# Patient Record
Sex: FEMALE | Race: WHITE | NOT HISPANIC OR LATINO | Employment: OTHER | URBAN - METROPOLITAN AREA
[De-identification: names, ages, dates, MRNs, and addresses within clinical notes are randomized per-mention and may not be internally consistent; named-entity substitution may affect disease eponyms.]

---

## 2021-02-14 ENCOUNTER — APPOINTMENT (OUTPATIENT)
Dept: GENERAL RADIOLOGY | Facility: HOSPITAL | Age: 86
End: 2021-02-14

## 2021-02-14 ENCOUNTER — HOSPITAL ENCOUNTER (EMERGENCY)
Facility: HOSPITAL | Age: 86
Discharge: HOME | End: 2021-02-14
Attending: EMERGENCY MEDICINE | Admitting: EMERGENCY MEDICINE

## 2021-02-14 VITALS
BODY MASS INDEX: 24.66 KG/M2 | RESPIRATION RATE: 16 BRPM | SYSTOLIC BLOOD PRESSURE: 125 MMHG | DIASTOLIC BLOOD PRESSURE: 78 MMHG | HEART RATE: 88 BPM | WEIGHT: 148 LBS | TEMPERATURE: 98.1 F | OXYGEN SATURATION: 97 % | HEIGHT: 65 IN

## 2021-02-14 DIAGNOSIS — S73.035A ANTERIOR DISLOCATION OF LEFT HIP, INITIAL ENCOUNTER (HCC): Primary | ICD-10-CM

## 2021-02-14 DIAGNOSIS — I48.20 CHRONIC ATRIAL FIBRILLATION (HCC): ICD-10-CM

## 2021-02-14 PROCEDURE — 25010000002 PROPOFOL 10 MG/ML EMULSION: Performed by: EMERGENCY MEDICINE

## 2021-02-14 PROCEDURE — 99284 EMERGENCY DEPT VISIT MOD MDM: CPT

## 2021-02-14 PROCEDURE — 73501 X-RAY EXAM HIP UNI 1 VIEW: CPT

## 2021-02-14 PROCEDURE — 93010 ELECTROCARDIOGRAM REPORT: CPT | Performed by: INTERNAL MEDICINE

## 2021-02-14 PROCEDURE — 73502 X-RAY EXAM HIP UNI 2-3 VIEWS: CPT

## 2021-02-14 PROCEDURE — 93005 ELECTROCARDIOGRAM TRACING: CPT | Performed by: EMERGENCY MEDICINE

## 2021-02-14 PROCEDURE — 99152 MOD SED SAME PHYS/QHP 5/>YRS: CPT

## 2021-02-14 RX ORDER — KETAMINE HYDROCHLORIDE 100 MG/ML
INJECTION INTRAMUSCULAR; INTRAVENOUS
Status: COMPLETED | OUTPATIENT
Start: 2021-02-14 | End: 2021-02-14

## 2021-02-14 RX ORDER — PROPOFOL 10 MG/ML
VIAL (ML) INTRAVENOUS
Status: COMPLETED | OUTPATIENT
Start: 2021-02-14 | End: 2021-02-14

## 2021-02-14 RX ORDER — PROPOFOL 10 MG/ML
50 VIAL (ML) INTRAVENOUS ONCE
Status: DISCONTINUED | OUTPATIENT
Start: 2021-02-14 | End: 2021-02-14

## 2021-02-14 RX ORDER — KETAMINE HCL IN NACL, ISO-OSM 100MG/10ML
25 SYRINGE (ML) INJECTION ONCE
Status: DISCONTINUED | OUTPATIENT
Start: 2021-02-14 | End: 2021-02-14

## 2021-02-14 RX ADMIN — KETAMINE HYDROCHLORIDE 25 MG: 100 INJECTION INTRAMUSCULAR; INTRAVENOUS at 15:18

## 2021-02-14 RX ADMIN — SODIUM CHLORIDE 1000 ML: 9 INJECTION, SOLUTION INTRAVENOUS at 15:28

## 2021-02-14 RX ADMIN — PROPOFOL 30 MG: 10 INJECTION, EMULSION INTRAVENOUS at 15:20

## 2021-02-14 RX ADMIN — PROPOFOL 30 MG: 10 INJECTION, EMULSION INTRAVENOUS at 15:31

## 2021-02-14 NOTE — ED NOTES
Attempted to reach pt's daughter listed in demographics, left message in voicemail     Kari Connolly, RN  02/14/21 [id number]

## 2021-02-14 NOTE — ED NOTES
Pt's daughter to come  pt, coming from Jeevan Connolly, Kari Morrissey, KATHARINA  02/14/21 3736

## 2021-02-14 NOTE — ED NOTES
Spoke to Marilyn GONZALEZ at [facility]. Advised Md requests pt to have elevated toilet seat and that she will be returning via private vehicle with an abductor pillow     Mohsen, Kari Morrissey RN  02/14/21 1476

## 2021-02-14 NOTE — ED TRIAGE NOTES
Pt arrives via EMS from an Assisted living facility. Was bent over when she heard a pop. Pt has shortening and rotation to her left hip with obvious deformity. Pt received ketamine prior to arrival. Pt states that she does not process pain medications normally. Patient masked at arrival and triage staff wore all appropriate PPE during entire encounter with patient.

## 2021-02-14 NOTE — ED NOTES
Spoke to pt's son Mark, asked if he could try to contact his sisters to get pt a ride back to facility     Mohsen, Kari [place], RN  02/14/21 3136

## 2021-02-14 NOTE — ED PROVIDER NOTES
EMERGENCY DEPARTMENT ENCOUNTER    Room Number:  34/34  Date of encounter:  2/14/2021  PCP: Pao Zamora MD  Historian: Pt    Patient was placed in face mask during triage process. Patient was wearing facemask when I entered the room and throughout our encounter. I wore full protective equipment throughout this patient encounter including a face mask, eye protection, and gloves. Hand hygiene was performed before donning protective equipment and again following doffing of PPE after leaving the room.    HPI:  Chief Complaint: left hip pain  A complete HPI/ROS/PMH/PSH/SH/FH are unobtainable due to: N/A   Context: Sarahi Bentley is a 85 y.o. female who presents to the ED c/o left hip pain consistent with history of prior dislocation.  Patient notes that she was bending over cleaning up an area in her kitchen when she felt a pop and had sudden pain in her left hip and had to sit down.  She has been unable to bear weight on it since this event.  Patient has a history of bilateral hip prostheses as well as bilateral knee prostheses.  Patient had similar event last June and was seen at another facility.  Patient denies chest pain, shortness of breath and cough, and fever.  Moderately severe discomfort at this time.  Worse with any attempted range of motion of left hip.      MEDICAL HISTORY REVIEW  Atrial fibrillation, HTN, Greyson hip and knee replacements    PAST MEDICAL HISTORY  Active Ambulatory Problems     Diagnosis Date Noted   • No Active Ambulatory Problems     Resolved Ambulatory Problems     Diagnosis Date Noted   • No Resolved Ambulatory Problems     No Additional Past Medical History         PAST SURGICAL HISTORY  No past surgical history on file.      FAMILY HISTORY  No family history on file.      SOCIAL HISTORY  Social History     Socioeconomic History   • Marital status:      Spouse name: Not on file   • Number of children: Not on file   • Years of education: Not on file   • Highest  education level: Not on file         ALLERGIES  Patient has no known allergies.        REVIEW OF SYSTEMS  Review of Systems     All systems reviewed and negative except for those discussed in HPI.       PHYSICAL EXAM    I have reviewed the triage vital signs and nursing notes.    ED Triage Vitals   Temp Heart Rate Resp BP SpO2   02/14/21 1405 02/14/21 1305 02/14/21 1305 02/14/21 1305 02/14/21 1305   98.1 °F (36.7 °C) 74 16 140/69 96 %      Temp src Heart Rate Source Patient Position BP Location FiO2 (%)   02/14/21 1405 02/14/21 1305 02/14/21 1305 02/14/21 1517 --   Oral Monitor Lying Right arm          Physical Exam    Physical Exam   Constitutional: No distress but uncomfortable appearing.  HENT:  Head: Normocephalic and atraumatic.   Oropharynx: Mucous membranes are moist.   Eyes: No scleral icterus. No conjunctival pallor.  Neck: Painless range of motion noted. Neck supple.   Cardiovascular: Pink warm and well-perfused with strong radial pulse noted on the left.  Pulmonary/Chest: No respiratory distress.  No tachypnea or increased work of breathing noted.    Abdominal: Soft. There is no tenderness. There is no rebound and no guarding.   Musculoskeletal: Atraumatic upper extremity exam.  Moves right lower extremity without difficulty.  Left lower extremity is internally rotated and shortened consistent with patient's concern about left hip dislocation.  Cap refill left lower extremity is normal with sensation to light touch intact.    Neurological: Alert.  Baseline strength and sensation noted.   Skin: Skin is pink, warm, and dry. No pallor.   Psychiatric: Mood and affect normal.   Nursing note and vitals reviewed.    LAB RESULTS  Recent Results (from the past 24 hour(s))   ECG 12 Lead    Collection Time: 02/14/21  3:05 PM   Result Value Ref Range    QT Interval 370 ms       Ordered the above labs and independently reviewed the results.        RADIOLOGY  Xr Hip With Or Without Pelvis 2 - 3 View Left    Result  Date: 2/14/2021  Pelvis and bilateral hip radiograph  HISTORY:Deformity  TECHNIQUE: AP pelvis and single AP radiographs of the bilateral hips  COMPARISON:Hip radiographs 11/20/2006      FINDINGS AND IMPRESSION: Moderate to severe multilevel degenerative changes are present within the visualized lower lumbar spine and incompletely evaluated. Findings can be further evaluated with MRI if clinically indicated. 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 The left hip arthroplasty is dislocated posteriorly. While no periprosthetic fracture is visualized, evaluation is limited due to only a single AP radiograph provided.  This report was finalized on 2/14/2021 2:30 PM by Dr. Andrzej Greenberg M.D.      RADIOLOGY        Study: Single view left hip-post reduction    Findings: Significant improved positioning consistent with reduced joint    Interpreted contemporaneously with treatment by me, independently viewed by me    I ordered the above noted radiological studies. Reviewed by me and discussed with radiologist.  See dictation for official radiology interpretation.      PROCEDURES    Procedural Sedation    Date/Time: 2/14/2021 3:47 PM  Performed by: Irwin Crain MD  Authorized by: Irwin Crain MD     Consent:     Consent obtained:  Verbal    Consent given by:  Patient    Risks discussed:  Allergic reaction, dysrhythmia, inadequate sedation, nausea, vomiting, respiratory compromise necessitating ventilatory assistance and intubation, prolonged sedation necessitating reversal and prolonged hypoxia resulting in organ damage    Alternatives discussed: Surgical intervention.  Indications:     Procedure performed:  Dislocation reduction    Procedure necessitating sedation performed by:  Different physician    Intended level of sedation:  Moderate (conscious sedation)  Pre-sedation assessment:     Time since last food or drink:  7    ASA classification: class 2 - patient with mild systemic disease      Neck mobility: normal       Mouth opening:  3 or more finger widths    Thyromental distance:  4 finger widths    Mallampati score:  I - soft palate, uvula, fauces, pillars visible    Pre-sedation assessments completed and reviewed: airway patency, anesthesia/sedation history, cardiovascular function, hydration status, mental status, nausea/vomiting, pain level and respiratory function    Immediate pre-procedure details:     Reviewed: vital signs and relevant labs/tests      Verified: bag valve mask available, emergency equipment available, intubation equipment available, IV patency confirmed and oxygen available    Procedure details (see MAR for exact dosages):     Preoxygenation:  Nasal cannula    Sedation: Propofol.    Analgesia: Ketamine.    Intra-procedure monitoring:  Blood pressure monitoring, cardiac monitor, continuous capnometry, continuous pulse oximetry, frequent LOC assessments and frequent vital sign checks    Intra-procedure events: hypoxia      Intra-procedure management:  Airway repositioning and BVM ventilation    Total sedation time (minutes):  10  Post-procedure details:     Attendance: Constant attendance by certified staff until patient recovered      Recovery: Patient returned to pre-procedure baseline      Post-sedation assessments completed and reviewed: airway patency, cardiovascular function, hydration status, mental status, nausea/vomiting, pain level and respiratory function      Patient is stable for discharge or admission: yes      Patient tolerance:  Tolerated well, no immediate complications            MEDICATIONS GIVEN IN ER    Medications   sodium chloride 0.9 % bolus 1,000 mL (0 mL Intravenous Stopped 2/14/21 1544)   ketamine (KETALAR) injection (25 mg Intravenous Given 2/14/21 1518)   Propofol (DIPRIVAN) injection (30 mg Intravenous Given 2/14/21 1520)   Propofol (DIPRIVAN) injection (30 mg Intravenous Given 2/14/21 1531)         PROGRESS, DATA ANALYSIS, CONSULTS, AND MEDICAL DECISION MAKING    My  diagnosis for lower extremity pain and injury includes but is not limited to hip fracture, femur fracture, hip dislocation, hip contusion, hip sprain, hip strain, pelvic fracture, knee sprain, patella dislocation, knee dislocation, internal derangement of knee, fractures of the femur, tibia, fibula, ankle, foot and digits, ankle sprain, ankle dislocation, Lisfranc fracture, fracture dislocations of the digits, pulmonary embolism, claudication, peripheral vascular disease, gout, osteoarthritis, rheumatoid arthritis, bursitis, septic joint, poly-rheumatica, polyarthralgia and other inflammatory or infectious disease processes.      All labs have been independently reviewed by me.  All radiology studies have been reviewed by me and discussed with radiologist dictating the report.   EKG's independently viewed and interpreted by me.  Discussion below represents my analysis of pertinent findings related to patient's condition, differential diagnosis, treatment plan and final disposition.      ED Course as of Feb 14 1554   Sun Feb 14, 2021   1416 CONSULT        Provider: Dr. Libby Ram    Discussion: Reviewed pt history, ED presentation and images together. Agreeable c attempted ED reduction. Will see pt in office in follow-up.    Agreeable c treatment and planned disposition.            [RS]   1512 EKG           EKG time: 1505  Rhythm/Rate: a.fib; 85  P waves and NM: na  QRS, axis: narrow   ST and T waves: no STEMI; QTc wnls    Interpreted Contemporaneously by me, independently viewed        [RS]      ED Course User Index  [RS] Irwin Crain MD       AS OF 15:54 EST VITALS:    BP - 120/49  HR - 81  TEMP - 98.1 °F (36.7 °C) (Oral)  O2 SATS - 97%        DIAGNOSIS  Final diagnoses:   Anterior dislocation of left hip, initial encounter (CMS/[facility])   Chronic atrial fibrillation (CMS/[facility])         DISPOSITION  DISCHARGE    Patient discharged in stable condition.    Reviewed implications of results, diagnosis, meds,  responsibility to follow up, warning signs and symptoms of possible worsening, potential complications and reasons to return to ER.    Patient/Family voiced understanding of above instructions.    Discussed plan for discharge, as there is no emergent indication for admission. Patient referred to primary care provider for regular health maintenance. Pt/family is agreeable and understands need for follow up and possible repeat testing.  Pt is aware that discharge does not mean that nothing is wrong but it indicates no emergency is present that requires admission and they must continue care with follow-up as given below or physician of their choice.     FOLLOW-UP  Pao Zamora MD  [address]  [facility] [id number] [phone]    Schedule an appointment as soon as possible for a visit   As needed    Josh Souza MD  [address]  [facility] [id number] [phone]    Schedule an appointment as soon as possible for a visit in 1 week           Medication List      No changes were made to your prescriptions during this visit.            Irwin Crain MD  02/14/21 5081

## 2021-02-14 NOTE — ED PROVIDER NOTES
"Lower Extremity Dislocation    Date/Time: 2/14/2021 3:37 PM  Performed by: Edmundo Wilson PA  Authorized by: Irwin Crain MD   Consent: Verbal consent obtained.  Risks and benefits: risks, benefits and alternatives were discussed  Consent given by: patient  Patient understanding: patient states understanding of the procedure being performed  Patient consent: the patient's understanding of the procedure matches consent given  Procedure consent: procedure consent matches procedure scheduled  Relevant documents: relevant documents present and verified  Test results: test results available and properly labeled  Imaging studies: imaging studies available  Required items: required blood products, implants, devices, and special equipment available  Patient identity confirmed: verbally with patient, arm band and hospital-assigned identification number  Time out: Immediately prior to procedure a \"time out\" was called to verify the correct patient, procedure, equipment, support staff and site/side marked as required.  Injury location: hip  Location details: left hip  Injury type: dislocation  Dislocation type: posterior  Spontaneous dislocation: yes  Prosthesis: yes  Pre-procedure neurovascular assessment: neurovascularly intact  Pre-procedure distal perfusion: normal  Pre-procedure neurological function: normal  Pre-procedure range of motion: reduced    Sedation:  Patient sedated: See Dr. Crain's note for sedation.    Manipulation performed: yes  Reduction method: traction and counter traction  Reduction successful: yes  X-ray confirmed reduction: yes  Immobilization: splint  Post-procedure neurovascular assessment: post-procedure neurovascularly intact  Post-procedure distal perfusion: normal  Post-procedure neurological function: normal  Post-procedure range of motion: normal  Patient tolerance: patient tolerated the procedure well with no immediate complications             Edmundo Wilson PA  02/14/21 1940    "

## 2021-02-15 LAB — QT INTERVAL: 370 MS

## 2022-06-01 ENCOUNTER — ANESTHESIA (OUTPATIENT)
Dept: PERIOP | Facility: HOSPITAL | Age: 87
End: 2022-06-01

## 2022-06-01 ENCOUNTER — ANESTHESIA EVENT (OUTPATIENT)
Dept: PERIOP | Facility: HOSPITAL | Age: 87
End: 2022-06-01

## 2022-06-01 ENCOUNTER — APPOINTMENT (OUTPATIENT)
Dept: GENERAL RADIOLOGY | Facility: HOSPITAL | Age: 87
End: 2022-06-01

## 2022-06-01 ENCOUNTER — HOSPITAL ENCOUNTER (OUTPATIENT)
Facility: HOSPITAL | Age: 87
LOS: 1 days | Discharge: HOME | End: 2022-06-02
Attending: EMERGENCY MEDICINE | Admitting: ORTHOPAEDIC SURGERY

## 2022-06-01 DIAGNOSIS — R53.1 GENERALIZED WEAKNESS: ICD-10-CM

## 2022-06-01 DIAGNOSIS — S73.005A CLOSED DISLOCATION OF LEFT HIP, INITIAL ENCOUNTER: Primary | ICD-10-CM

## 2022-06-01 LAB — SARS-COV-2 RNA PNL SPEC NAA+PROBE: NOT DETECTED

## 2022-06-01 PROCEDURE — 25010000002 FENTANYL CITRATE (PF) 50 MCG/ML SOLUTION: Performed by: EMERGENCY MEDICINE

## 2022-06-01 PROCEDURE — 25010000002 PROPOFOL 10 MG/ML EMULSION: Performed by: ANESTHESIOLOGY

## 2022-06-01 PROCEDURE — 0SWBXJZ REVISION OF SYNTHETIC SUBSTITUTE IN LEFT HIP JOINT, EXTERNAL APPROACH: ICD-10-PCS | Performed by: ORTHOPAEDIC SURGERY

## 2022-06-01 PROCEDURE — C9803 HOPD COVID-19 SPEC COLLECT: HCPCS

## 2022-06-01 PROCEDURE — 25010000002 FENTANYL CITRATE (PF) 50 MCG/ML SOLUTION: Performed by: ANESTHESIOLOGY

## 2022-06-01 PROCEDURE — 73501 X-RAY EXAM HIP UNI 1 VIEW: CPT

## 2022-06-01 PROCEDURE — 25010000002 HYDROMORPHONE PER 4 MG: Performed by: EMERGENCY MEDICINE

## 2022-06-01 PROCEDURE — 25010000002 HYDROMORPHONE 1 MG/ML SOLUTION: Performed by: EMERGENCY MEDICINE

## 2022-06-01 PROCEDURE — 25010000002 ONDANSETRON PER 1 MG: Performed by: EMERGENCY MEDICINE

## 2022-06-01 PROCEDURE — 73502 X-RAY EXAM HIP UNI 2-3 VIEWS: CPT

## 2022-06-01 PROCEDURE — 87635 SARS-COV-2 COVID-19 AMP PRB: CPT | Performed by: ORTHOPAEDIC SURGERY

## 2022-06-01 PROCEDURE — 99284 EMERGENCY DEPT VISIT MOD MDM: CPT

## 2022-06-01 PROCEDURE — 25010000002 PHENYLEPHRINE 10 MG/ML SOLUTION: Performed by: ANESTHESIOLOGY

## 2022-06-01 RX ORDER — FENTANYL CITRATE 50 UG/ML
50 INJECTION, SOLUTION INTRAMUSCULAR; INTRAVENOUS
Status: DISCONTINUED | OUTPATIENT
Start: 2022-06-01 | End: 2022-06-01 | Stop reason: HOSPADM

## 2022-06-01 RX ORDER — FAMOTIDINE 10 MG/ML
20 INJECTION, SOLUTION INTRAVENOUS ONCE
Status: COMPLETED | OUTPATIENT
Start: 2022-06-01 | End: 2022-06-01

## 2022-06-01 RX ORDER — ONDANSETRON 2 MG/ML
4 INJECTION INTRAMUSCULAR; INTRAVENOUS ONCE
Status: COMPLETED | OUTPATIENT
Start: 2022-06-01 | End: 2022-06-01

## 2022-06-01 RX ORDER — NALOXONE HCL 0.4 MG/ML
0.2 VIAL (ML) INJECTION AS NEEDED
Status: DISCONTINUED | OUTPATIENT
Start: 2022-06-01 | End: 2022-06-01 | Stop reason: HOSPADM

## 2022-06-01 RX ORDER — TRAZODONE HYDROCHLORIDE 150 MG/1
150 TABLET ORAL NIGHTLY
Status: ON HOLD | COMMUNITY
End: 2022-06-02

## 2022-06-01 RX ORDER — PROPOFOL 10 MG/ML
VIAL (ML) INTRAVENOUS AS NEEDED
Status: DISCONTINUED | OUTPATIENT
Start: 2022-06-01 | End: 2022-06-01 | Stop reason: SURG

## 2022-06-01 RX ORDER — TRAZODONE HYDROCHLORIDE 50 MG/1
50 TABLET ORAL NIGHTLY
COMMUNITY

## 2022-06-01 RX ORDER — CLONAZEPAM 1 MG/1
1 TABLET ORAL 2 TIMES DAILY
COMMUNITY

## 2022-06-01 RX ORDER — LIDOCAINE HYDROCHLORIDE 20 MG/ML
INJECTION, SOLUTION INFILTRATION; PERINEURAL AS NEEDED
Status: DISCONTINUED | OUTPATIENT
Start: 2022-06-01 | End: 2022-06-01 | Stop reason: SURG

## 2022-06-01 RX ORDER — SODIUM CHLORIDE 0.9 % (FLUSH) 0.9 %
3-10 SYRINGE (ML) INJECTION AS NEEDED
Status: DISCONTINUED | OUTPATIENT
Start: 2022-06-01 | End: 2022-06-01 | Stop reason: HOSPADM

## 2022-06-01 RX ORDER — DIPHENHYDRAMINE HYDROCHLORIDE 50 MG/ML
12.5 INJECTION INTRAMUSCULAR; INTRAVENOUS
Status: DISCONTINUED | OUTPATIENT
Start: 2022-06-01 | End: 2022-06-01 | Stop reason: HOSPADM

## 2022-06-01 RX ORDER — FLUMAZENIL 0.1 MG/ML
0.2 INJECTION INTRAVENOUS AS NEEDED
Status: DISCONTINUED | OUTPATIENT
Start: 2022-06-01 | End: 2022-06-01 | Stop reason: HOSPADM

## 2022-06-01 RX ORDER — LABETALOL HYDROCHLORIDE 5 MG/ML
5 INJECTION, SOLUTION INTRAVENOUS
Status: DISCONTINUED | OUTPATIENT
Start: 2022-06-01 | End: 2022-06-01 | Stop reason: HOSPADM

## 2022-06-01 RX ORDER — LIDOCAINE HYDROCHLORIDE 10 MG/ML
0.5 INJECTION, SOLUTION EPIDURAL; INFILTRATION; INTRACAUDAL; PERINEURAL ONCE AS NEEDED
Status: DISCONTINUED | OUTPATIENT
Start: 2022-06-01 | End: 2022-06-01 | Stop reason: HOSPADM

## 2022-06-01 RX ORDER — DOCUSATE SODIUM 100 MG/1
100 CAPSULE, LIQUID FILLED ORAL DAILY
COMMUNITY

## 2022-06-01 RX ORDER — MIDAZOLAM HYDROCHLORIDE 1 MG/ML
0.5 INJECTION INTRAMUSCULAR; INTRAVENOUS
Status: DISCONTINUED | OUTPATIENT
Start: 2022-06-01 | End: 2022-06-01 | Stop reason: HOSPADM

## 2022-06-01 RX ORDER — ONDANSETRON 4 MG/1
4 TABLET, FILM COATED ORAL EVERY 8 HOURS PRN
COMMUNITY

## 2022-06-01 RX ORDER — METOPROLOL TARTRATE 100 MG/1
100 TABLET ORAL DAILY
Status: ON HOLD | COMMUNITY
End: 2022-06-02 | Stop reason: ALTCHOICE

## 2022-06-01 RX ORDER — CHOLECALCIFEROL (VITAMIN D3) 1250 MCG
50000 CAPSULE ORAL
COMMUNITY

## 2022-06-01 RX ORDER — SODIUM CHLORIDE, SODIUM LACTATE, POTASSIUM CHLORIDE, CALCIUM CHLORIDE 600; 310; 30; 20 MG/100ML; MG/100ML; MG/100ML; MG/100ML
9 INJECTION, SOLUTION INTRAVENOUS CONTINUOUS
Status: DISCONTINUED | OUTPATIENT
Start: 2022-06-01 | End: 2022-06-02 | Stop reason: HOSPADM

## 2022-06-01 RX ORDER — HYDROMORPHONE HYDROCHLORIDE 1 MG/ML
0.5 INJECTION, SOLUTION INTRAMUSCULAR; INTRAVENOUS; SUBCUTANEOUS ONCE
Status: COMPLETED | OUTPATIENT
Start: 2022-06-01 | End: 2022-06-01

## 2022-06-01 RX ORDER — CLONAZEPAM 1 MG/1
2 TABLET ORAL NIGHTLY
Status: ON HOLD | COMMUNITY
End: 2022-06-02

## 2022-06-01 RX ORDER — CHOLECALCIFEROL (VITAMIN D3) 125 MCG
5 CAPSULE ORAL NIGHTLY
COMMUNITY

## 2022-06-01 RX ORDER — EPHEDRINE SULFATE 50 MG/ML
5 INJECTION, SOLUTION INTRAVENOUS ONCE AS NEEDED
Status: DISCONTINUED | OUTPATIENT
Start: 2022-06-01 | End: 2022-06-01 | Stop reason: HOSPADM

## 2022-06-01 RX ORDER — LOSARTAN POTASSIUM 50 MG/1
50 TABLET ORAL DAILY
COMMUNITY

## 2022-06-01 RX ORDER — LOPERAMIDE HYDROCHLORIDE 2 MG/1
2 CAPSULE ORAL DAILY PRN
COMMUNITY

## 2022-06-01 RX ORDER — SODIUM CHLORIDE 0.9 % (FLUSH) 0.9 %
3 SYRINGE (ML) INJECTION EVERY 12 HOURS SCHEDULED
Status: DISCONTINUED | OUTPATIENT
Start: 2022-06-01 | End: 2022-06-01 | Stop reason: HOSPADM

## 2022-06-01 RX ORDER — CLONAZEPAM 0.5 MG/1
0.5 TABLET ORAL
Status: ON HOLD | COMMUNITY
End: 2022-06-02 | Stop reason: DRUGHIGH

## 2022-06-01 RX ORDER — MIRTAZAPINE 30 MG/1
30 TABLET, FILM COATED ORAL NIGHTLY
Status: ON HOLD | COMMUNITY
End: 2022-06-02

## 2022-06-01 RX ORDER — PHENYLEPHRINE HYDROCHLORIDE 10 MG/ML
INJECTION INTRAVENOUS AS NEEDED
Status: DISCONTINUED | OUTPATIENT
Start: 2022-06-01 | End: 2022-06-01 | Stop reason: SURG

## 2022-06-01 RX ORDER — FENTANYL CITRATE 50 UG/ML
50 INJECTION, SOLUTION INTRAMUSCULAR; INTRAVENOUS ONCE
Status: COMPLETED | OUTPATIENT
Start: 2022-06-01 | End: 2022-06-01

## 2022-06-01 RX ORDER — HYDRALAZINE HYDROCHLORIDE 20 MG/ML
5 INJECTION INTRAMUSCULAR; INTRAVENOUS
Status: DISCONTINUED | OUTPATIENT
Start: 2022-06-01 | End: 2022-06-01 | Stop reason: HOSPADM

## 2022-06-01 RX ORDER — ONDANSETRON 2 MG/ML
4 INJECTION INTRAMUSCULAR; INTRAVENOUS ONCE AS NEEDED
Status: DISCONTINUED | OUTPATIENT
Start: 2022-06-01 | End: 2022-06-01 | Stop reason: HOSPADM

## 2022-06-01 RX ORDER — DIPHENHYDRAMINE HCL 25 MG
25 CAPSULE ORAL
Status: DISCONTINUED | OUTPATIENT
Start: 2022-06-01 | End: 2022-06-01 | Stop reason: HOSPADM

## 2022-06-01 RX ORDER — ESCITALOPRAM OXALATE 10 MG/1
10 TABLET ORAL DAILY
COMMUNITY

## 2022-06-01 RX ORDER — HYDROCODONE BITARTRATE AND ACETAMINOPHEN 5; 325 MG/1; MG/1
1 TABLET ORAL EVERY 6 HOURS PRN
Status: DISCONTINUED | OUTPATIENT
Start: 2022-06-01 | End: 2022-06-01 | Stop reason: HOSPADM

## 2022-06-01 RX ADMIN — LIDOCAINE HYDROCHLORIDE 60 MG: 20 INJECTION, SOLUTION INFILTRATION; PERINEURAL at 19:04

## 2022-06-01 RX ADMIN — FENTANYL CITRATE 50 MCG: 0.05 INJECTION, SOLUTION INTRAMUSCULAR; INTRAVENOUS at 16:36

## 2022-06-01 RX ADMIN — PROPOFOL 50 MG: 10 INJECTION, EMULSION INTRAVENOUS at 19:08

## 2022-06-01 RX ADMIN — PHENYLEPHRINE HYDROCHLORIDE 200 MCG: 10 INJECTION, SOLUTION INTRAVENOUS at 19:15

## 2022-06-01 RX ADMIN — SODIUM CHLORIDE, POTASSIUM CHLORIDE, SODIUM LACTATE AND CALCIUM CHLORIDE 9 ML/HR: 600; 310; 30; 20 INJECTION, SOLUTION INTRAVENOUS at 18:37

## 2022-06-01 RX ADMIN — ONDANSETRON 4 MG: 2 INJECTION INTRAMUSCULAR; INTRAVENOUS at 13:27

## 2022-06-01 RX ADMIN — HYDROMORPHONE HYDROCHLORIDE 0.5 MG: 1 INJECTION, SOLUTION INTRAMUSCULAR; INTRAVENOUS; SUBCUTANEOUS at 13:27

## 2022-06-01 RX ADMIN — PROPOFOL 150 MG: 10 INJECTION, EMULSION INTRAVENOUS at 19:05

## 2022-06-01 RX ADMIN — FENTANYL CITRATE 25 MCG: 0.05 INJECTION, SOLUTION INTRAMUSCULAR; INTRAVENOUS at 18:38

## 2022-06-01 RX ADMIN — FAMOTIDINE 20 MG: 10 INJECTION, SOLUTION INTRAVENOUS at 18:37

## 2022-06-01 RX ADMIN — HYDROMORPHONE HYDROCHLORIDE 1 MG: 1 INJECTION, SOLUTION INTRAMUSCULAR; INTRAVENOUS; SUBCUTANEOUS at 14:15

## 2022-06-01 RX ADMIN — FENTANYL CITRATE 25 MCG: 0.05 INJECTION, SOLUTION INTRAMUSCULAR; INTRAVENOUS at 18:45

## 2022-06-01 NOTE — ED NOTES
PT presents to ED via EMS from facility. Pt fell off her bed and injured her L hip. Upon EMS arrival pt has obvious deformity. PT is A&OX4, did not ambulate, and in a mask at this time.     Patient was placed in face mask during first look triage.  Patient was wearing a face mask throughout encounter.  I wore personal protective equipment throughout the encounter.  Hand hygiene was performed before and after patient encounter.

## 2022-06-01 NOTE — OP NOTE
Procedure Note    Sarahi Bentley  6/1/2022    Pre-op Diagnosis:   1.  Acute left hip prosthetic dislocation       Post-Op Diagnosis Codes:  Same    Procedure:  1.  Closed reduction under anesthesia, left hip prosthetic dislocation    Surgeon(s):  Tristan Buckner Jr., MD    Assistants:  None    Anesthesia: General    Estimated Blood Loss: none    Specimens:                None      Drains: * No LDAs found *    Complications:   None apparent    Disposition:  Stable to PACU for recovery    Indications for procedure:  The patient is an 87-year-old female who has had a history of left periprosthetic hip dislocations who presented today with left hip deformity and pain.  Radiographs demonstrated a left periprosthetic hip dislocation without fracture.  The above listed procedures were recommended. I have discussed the risks and benefits of the procedure including risks of anesthesia, periprosthetic fracture, inability to get the hip reduced and the patient would like to proceed with surgery.    Procedure in detail:  The correct patient was identified in preoperative holding.  All risks and benefits of surgery were again discussed in detail, and the patient agreed to proceed with surgery.  The operative extremity was confirmed and marked by myself.  Operative consent reviewed and confirmed to be signed by the patient and myself.    At this time, the patient was wheeled to the operative theatre and placed supine on the OR table.  ANNA/SCD placed on nonoperative leg. Anesthesia was induced smoothly by our anesthesia colleagues.    Appropriate presurgical timeout was performed, confirming correct patient, correct extremity, correct procedure; no antibiotics were indicated.    Using a combination of longitudinal traction and gentle progressive flexion with internal and external rotation, the left hip dislocation was able to be reduced fairly easily.  A radiograph confirmed concentric reduction of the hip without associated  fracture.  The hip was taken through a range of motion and found to be stable to 90 degrees of flexion and 20 degrees of internal rotation.    Leg lengths were now equal and symmetric.    An abduction pillow was applied.  The patient was awoken from anesthesia without apparent complication and taken to PACU for recovery.            Tristan Buckner Jr, MD     Date: 6/1/2022  Time: 19:16 EDT

## 2022-06-01 NOTE — CONSULTS
[facility]   Consult Note    Patient Name: Sarahi Bentley  : 1935  MRN: [id number]  Primary Care Physician:  Pao Zamora MD  Referring Physician: No ref. provider found  Date of admission: 2022    Ortho (on-call MD unless specified)  Consult performed by: Tristan Buckner Jr., MD  Consult ordered by: Adonis Oliveira MD        Subjective   Subjective     Reason for Consult/ Chief Complaint: Left hip pain    History of Present Illness  Sarahi Bentley is a 87 y.o. female with history of atrial fibrillation and history of prior left hip replacement who presented with left hip pain and deformity.  She reports she leaned over her bed and felt a pop in her left hip.  She was brought to emergency department where radiographs showed a dislocated left periprosthetic hip.  Orthopedics was consulted for closed reduction.    The patient reports her hip was replaced by Dr. Stanford many years ago.  She thinks the hip has dislocated 2 times.  She denies any known complications following closed reduction.  It appears her last reduction was performed on 2021 and it was successfully reduced under sedation in the emergency department.    Review of Systems   Review of Systems:  Constitutional: Negative  HENT: Negative  Eyes: Negative  Respiratory: Negative; no shortness of breath  Cardiovascular: Negative; no current chest pain  Gastrointestinal: Negative  : Negative  Skin: Negative except as listed in HPI  Neurological: Negative, no numbness or deficits  Hematological: Negative  Muscoloskeletal: Per HPI                     Personal History     Past Medical History:   Diagnosis Date   • Atrial fibrillation (HCC)    • H/O degenerative disc disease        Past Surgical History:   Procedure Laterality Date   • BREAST BIOPSY     • HIP SURGERY Bilateral    • KNEE SURGERY Bilateral        Family History: family history is not on file. Otherwise pertinent FHx was reviewed and not pertinent to current  issue.    Social History:  reports that she has never smoked. She has never used smokeless tobacco. She reports that she does not drink alcohol and does not use drugs.    Home Medications:        Allergies:  Allergies   Allergen Reactions   • Sulfa Antibiotics Rash       Objective    Objective     Vitals:  Temp:  [97.5 °F (36.4 °C)-97.8 °F (36.6 °C)] 97.5 °F (36.4 °C)  Heart Rate:  [] 102  Resp:  [16-20] 16  BP: (130-145)/(61-89) 145/61    Physical Exam  Physical Exam:  Vital signs reviewed.  Constitutional:  Appears well-developed, well nourished.  HEENT:  Head: normocephalic, atraumatic  Eyes: EOMI, grossly normal.  No scleral icterus.  Neck: Normal range of motion.  Supple, no tracheal deviation.  Cardiovascular: Regular rate.  Pulmonary: Effort normal, symmetric chest expansion, no labored breathing.  Abdominal: Soft, non distended  Neurological: No gross deficits, oriented to person, place, and time.  Skin: Warm and dry  Psychiatric: Normal mood and affect  Musculoskeletal:  Examination of her left hip shows no wounds.  Her extremity is internally rotated.  She has pain with any motion at the hip.       Active ankle dorsiflexion and plantarflexion.  Sensation is intact to light touch in sural, saphenous, deep and superficial peroneal, tibial nerve distribution.  Toes are warm and well perfused with brisk capillary refill.           Result Review    Result Review:  Radiographs of the pelvis/left hip show a dislocation of the left hip prosthesis.  No obvious fracture identified.    Assessment & Plan   Assessment / Plan     The patient is a pleasant 87-year-old female with multiple medical comorbidities presenting with a recurrent spontaneous left hip dislocation.  She reports this is her third dislocation.  Her last hip was reduced in February 2021.    I have recommended closed reduction under anesthesia.    I have discussed the risks and benefits of the procedure including risks of anesthesia,  periprosthetic fracture, inability to get the hip reduced and the patient would like to proceed with surgery.          Tristan Buckner Jr, MD

## 2022-06-01 NOTE — ED PROVIDER NOTES
" EMERGENCY DEPARTMENT ENCOUNTER    Room Number:  07/07  Date seen:  6/1/2022  Time seen: 14:13 EDT  PCP: Pao Zamora MD  Historian: patient      HPI:  Chief Complaint: \"My hip is dislocated again\"    A complete HPI/ROS/PMH/PSH/SH/FH are unobtainable due to: none    Context: Sarahi Bentley is a 87 y.o. female who presents to the ED for evaluation of left hip pain that began acutely when she was sitting on her bed reaching for something and felt her left hip \"pop out.\"  She had a left hip replacement years ago with Dr. Young who is since retired.  Since that time she believes this is the third time it has dislocated.  She did not fall.  She arrives via EMS for further evaluation.  On their arrival she was in bed.  She denies any other injuries or complaints.  She states the pain is severe constant worse with any attempted range of motion and nothing makes it better.        PAST MEDICAL HISTORY  Active Ambulatory Problems     Diagnosis Date Noted   • No Active Ambulatory Problems     Resolved Ambulatory Problems     Diagnosis Date Noted   • No Resolved Ambulatory Problems     No Additional Past Medical History         PAST SURGICAL HISTORY  No past surgical history on file.      FAMILY HISTORY  No family history on file.      SOCIAL HISTORY  Social History     Socioeconomic History   • Marital status:          ALLERGIES  Patient has no known allergies.        REVIEW OF SYSTEMS  Review of Systems     All systems reviewed and negative except for those discussed in HPI.       PHYSICAL EXAM  ED Triage Vitals [06/01/22 1305]   Temp Heart Rate Resp BP SpO2   97.8 °F (36.6 °C) 86 20 133/74 99 %      Temp src Heart Rate Source Patient Position BP Location FiO2 (%)   Tympanic Monitor Sitting Right arm --         GENERAL: Distressed, appears in pain  HENT: atraumatic  EYES: no scleral icterus  CV: regular rhythm, regular rate  RESPIRATORY: normal effort CTA B  ABDOMEN: soft, nontender  MUSCULOSKELETAL: " Left hip is internally rotated and shortened.  DP and PT pulses 2+, cap refill brisk and sensation and motor function intact distally.  No tenderness to the right hip  NEURO: alert, moves all extremities, follows commands  SKIN: warm, dry    Vital signs and nursing notes reviewed.          RADIOLOGY  XR Hip With or Without Pelvis 2 - 3 View Left    Result Date: 6/1/2022  Narrative: PELVIS AND LEFT HIP  HISTORY: Left hip pain.  FINDINGS: An AP view of the pelvis as well as AP and frog leg lateral views of the left hip demonstrates bilateral hip prostheses. There is dislocation of the left femoral component superiorly. There is no evidence of fracture. Moderate to severe loss of disc height and vacuum disc effect is noted from L3 to L5.  This report was finalized on 6/1/2022 3:46 PM by Dr. Jon Summers M.D.        I ordered the above noted radiological studies. Reviewed by me and discussed with radiologist.  See dictation for official radiology interpretation.    PROCEDURES  Procedures        MEDICATIONS GIVEN IN ER  Medications   HYDROmorphone (DILAUDID) injection 0.5 mg (0.5 mg Intravenous Given 6/1/22 1327)   ondansetron (ZOFRAN) injection 4 mg (4 mg Intravenous Given 6/1/22 1327)   HYDROmorphone (DILAUDID) injection 1 mg (1 mg Intravenous Given 6/1/22 1415)             PROGRESS AND CONSULTS    DDX includes but not limited to fracture, strain, dislocation    ED Course as of 06/01/22 1619   Wed Jun 01, 2022   1418 My interpretation of the left hip x-ray is acute dislocation of the left hip arthroplasty. [KA]   1420 Medical chart reviewed.  Patient evaluated on 2/14/2021 for a left hip dislocation.  It was reduced in the emergency department without complication. [KA]   1618 Orthopedics repaged.  Full course of care assumed by Dr. Oliveira at this time. [KA]      ED Course User Index  [KA] Rivka Rosen PA             Patient was placed in face mask in first look. Patient was wearing facemask each time I entered  the room and throughout our encounter. I wore protective equipment throughout this patient encounter including a face mask, eye shield and gloves. Hand hygiene was performed before donning protective equipment and after removal when leaving the room.        DIAGNOSIS  Final diagnoses:   Closed dislocation of left hip, initial encounter ([facility])         Latest Documented Vital Signs:  As of 16:19 EDT  BP- 133/84 HR- 95 Temp- 97.8 °F (36.6 °C) (Tympanic) O2 sat- 94%       Rivka Rosen PA  06/01/22 4607

## 2022-06-01 NOTE — ED PROVIDER NOTES
I supervised care provided by the midlevel provider.   We have discussed this patient's history, physical exam, and treatment plan.  I have reviewed the note and personally saw and examined the patient and agree with the plan of care.   Patient was on the bed and leaning over to the Rollator and felt a sudden pop in her left hip.  She is had a history of a dislocation in that left hip before.  It is happened from what she remembers 2 times before.  He might of been third.  The last episode was about 1 year ago and Talley's Day.  Denies any other injury.  Denies any other pain.    GENERAL: Elderly female that is frail and appears chronically ill.  Not distressed  HENT: nares patent  Head/neck/ face are symmetric without gross deformity or swelling  EYES: no scleral icterus  CV: regular rhythm, regular rate with intact distal pulses  RESPIRATORY: normal effort and no respiratory distress  ABDOMEN: soft and nontender  MUSCULOSKELETAL: Left leg is internally rotated and shortened.  Patient has pain with any sort of movement or palpation of the left hip.  She has intact distal pulses to the lower and upper extremities bilaterally.  She has no coolness or cyanosis.  Compartments are soft.  She has no motor or sensory changes to her lower extremities bilaterally.  NEURO: alert and appropriate, moves all extremities, follows commands  SKIN: warm, dry    Vital signs and nursing notes reviewed.    Plan I suspect the patient has a dislocated left hip.  I reviewed the x-rays and also reviewed the radiologist report.  I spoke with No harris the midlevel provider.  We have a call out to orthopedic physician.    The nurse caring for the patient spoke with a nurse with the orthopedic surgeon and the plan is to get a COVID swab and take her up to the OR for reduction.    We are currently under a pandemic from the COVID19 infection.  The patient presented to the emergency department by ambulance or personal vehicle. I followed the  current protocols required by Infection Control at [facility] in my evaluation and treatment of the patient. The patient was wearing a face mask during my evaluation and throughout my encounter. During my whole encounter with this patient I used appropriate personal protective equipment.  This equipment consisted of eye protection, facemask, gown, and gloves.  I applied this equipment before entering the room.      ED Course as of 06/01/22 2112 Wed Jun 01, 2022   1418 My interpretation of the left hip x-ray is acute dislocation of the left hip arthroplasty. [KA]   1420 Medical chart reviewed.  Patient evaluated on 2/14/2021 for a left hip dislocation.  It was reduced in the emergency department without complication. [KA]   1618 Orthopedics repaged.  Full course of care assumed by Dr. Oliveira at this time. [KA]      ED Course User Index  [KA] Rivak Rosen, PA     The orthopedic surgeon Dr. Buckner was in surgery.  The nurse communicated with Dr. Buckner's nurse in the operating room.  The plan is to take the patient up to the operating room for relocation as soon as he is done with the procedure.  Patient remained stable down here in the emergency department and then when surgery was ready they called for the patient and the patient went up to surgery.       Adonis Oliveira MD  06/01/22 2112

## 2022-06-01 NOTE — ANESTHESIA PREPROCEDURE EVALUATION
Anesthesia Evaluation     Patient summary reviewed and Nursing notes reviewed   NPO Solid Status: > 6 hours  NPO Liquid Status: > 2 hours           Airway   Mallampati: II  Dental - normal exam     Pulmonary - normal exam   Cardiovascular - normal exam    ECG reviewed  PT is on anticoagulation therapy    (+) dysrhythmias Atrial Fib,     ROS comment: Reviewed echo:    1. Normal biventricular chamber size and systolic function. LVEF 55-60%.    2. Qualitatively, mildly dilated right and left atria.    3. Sclerotic aortic valve with trivial aortic regurgitation. 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    4. Mild to moderate mitral and tricuspid regurgitation.    5. RVSP estimated at 37 millimeters of mercury.    6. Indeterminate diastolic filling pattern due to underlying arrhythmia.    7. Normal right-sided filling pressures.        Neuro/Psych  GI/Hepatic/Renal/Endo    (+) obesity,       Musculoskeletal     Abdominal    Substance History      OB/GYN          Other                        Anesthesia Plan    ASA 3     general       Anesthetic plan, all risks, benefits, and alternatives have been provided, discussed and informed consent has been obtained with: patient.        CODE STATUS:

## 2022-06-02 VITALS
HEART RATE: 94 BPM | RESPIRATION RATE: 16 BRPM | OXYGEN SATURATION: 96 % | DIASTOLIC BLOOD PRESSURE: 61 MMHG | HEIGHT: 66 IN | WEIGHT: 170.3 LBS | BODY MASS INDEX: 27.37 KG/M2 | TEMPERATURE: 98 F | SYSTOLIC BLOOD PRESSURE: 93 MMHG

## 2022-06-02 PROBLEM — I10 HTN (HYPERTENSION): Status: ACTIVE | Noted: 2022-06-02

## 2022-06-02 PROBLEM — I48.91 ATRIAL FIBRILLATION (HCC): Status: ACTIVE | Noted: 2022-06-02

## 2022-06-02 PROBLEM — K58.9 IBS (IRRITABLE BOWEL SYNDROME): Status: ACTIVE | Noted: 2022-06-02

## 2022-06-02 PROCEDURE — 97110 THERAPEUTIC EXERCISES: CPT

## 2022-06-02 PROCEDURE — 97161 PT EVAL LOW COMPLEX 20 MIN: CPT

## 2022-06-02 RX ORDER — METOPROLOL SUCCINATE 100 MG/1
100 TABLET, EXTENDED RELEASE ORAL DAILY
COMMUNITY

## 2022-06-02 RX ORDER — CLONAZEPAM 1 MG/1
2 TABLET ORAL NIGHTLY
Status: DISCONTINUED | OUTPATIENT
Start: 2022-06-02 | End: 2022-06-02

## 2022-06-02 RX ORDER — MULTIPLE VITAMINS W/ MINERALS TAB 9MG-400MCG
1 TAB ORAL DAILY
COMMUNITY

## 2022-06-02 RX ORDER — CHOLECALCIFEROL (VITAMIN D3) 125 MCG
10 CAPSULE ORAL NIGHTLY
Status: DISCONTINUED | OUTPATIENT
Start: 2022-06-02 | End: 2022-06-02 | Stop reason: HOSPADM

## 2022-06-02 RX ORDER — CLONAZEPAM 1 MG/1
1 TABLET ORAL EVERY MORNING
Status: DISCONTINUED | OUTPATIENT
Start: 2022-06-02 | End: 2022-06-02

## 2022-06-02 RX ORDER — ZOLPIDEM TARTRATE 12.5 MG/1
12.5 TABLET, FILM COATED, EXTENDED RELEASE ORAL NIGHTLY
COMMUNITY

## 2022-06-02 RX ORDER — CLONAZEPAM 0.5 MG/1
0.5 TABLET ORAL
Status: DISCONTINUED | OUTPATIENT
Start: 2022-06-02 | End: 2022-06-02

## 2022-06-02 RX ORDER — CLONAZEPAM 1 MG/1
1 TABLET ORAL EVERY 12 HOURS SCHEDULED
Status: DISCONTINUED | OUTPATIENT
Start: 2022-06-02 | End: 2022-06-02 | Stop reason: HOSPADM

## 2022-06-02 RX ORDER — MIRTAZAPINE 30 MG/1
30 TABLET, FILM COATED ORAL NIGHTLY
COMMUNITY

## 2022-06-02 RX ORDER — MIRTAZAPINE 30 MG/1
30 TABLET, FILM COATED ORAL NIGHTLY
Status: DISCONTINUED | OUTPATIENT
Start: 2022-06-02 | End: 2022-06-02 | Stop reason: HOSPADM

## 2022-06-02 RX ORDER — LOPERAMIDE HYDROCHLORIDE 2 MG/1
2 CAPSULE ORAL DAILY PRN
Status: DISCONTINUED | OUTPATIENT
Start: 2022-06-02 | End: 2022-06-02 | Stop reason: HOSPADM

## 2022-06-02 RX ORDER — ONDANSETRON 2 MG/ML
4 INJECTION INTRAMUSCULAR; INTRAVENOUS EVERY 6 HOURS PRN
Status: DISCONTINUED | OUTPATIENT
Start: 2022-06-02 | End: 2022-06-02 | Stop reason: HOSPADM

## 2022-06-02 RX ORDER — ESCITALOPRAM OXALATE 10 MG/1
10 TABLET ORAL DAILY
Status: DISCONTINUED | OUTPATIENT
Start: 2022-06-02 | End: 2022-06-02 | Stop reason: HOSPADM

## 2022-06-02 RX ORDER — METOPROLOL TARTRATE 50 MG/1
100 TABLET, FILM COATED ORAL DAILY
Status: DISCONTINUED | OUTPATIENT
Start: 2022-06-02 | End: 2022-06-02

## 2022-06-02 RX ORDER — METOPROLOL SUCCINATE 100 MG/1
100 TABLET, EXTENDED RELEASE ORAL
Status: DISCONTINUED | OUTPATIENT
Start: 2022-06-03 | End: 2022-06-02 | Stop reason: HOSPADM

## 2022-06-02 RX ORDER — TRAZODONE HYDROCHLORIDE 50 MG/1
50 TABLET ORAL NIGHTLY
Status: DISCONTINUED | OUTPATIENT
Start: 2022-06-02 | End: 2022-06-02 | Stop reason: HOSPADM

## 2022-06-02 RX ORDER — ACETAMINOPHEN 325 MG/1
650 TABLET ORAL EVERY 6 HOURS PRN
Status: DISCONTINUED | OUTPATIENT
Start: 2022-06-02 | End: 2022-06-02 | Stop reason: HOSPADM

## 2022-06-02 RX ORDER — DOCUSATE SODIUM 100 MG/1
100 CAPSULE, LIQUID FILLED ORAL DAILY
Status: DISCONTINUED | OUTPATIENT
Start: 2022-06-02 | End: 2022-06-02 | Stop reason: HOSPADM

## 2022-06-02 RX ADMIN — Medication 10 MG: at 00:31

## 2022-06-02 RX ADMIN — ESCITALOPRAM 10 MG: 10 TABLET, FILM COATED ORAL at 12:42

## 2022-06-02 RX ADMIN — METOPROLOL TARTRATE 100 MG: 50 TABLET, FILM COATED ORAL at 09:08

## 2022-06-02 RX ADMIN — CLONAZEPAM 2 MG: 1 TABLET ORAL at 00:31

## 2022-06-02 RX ADMIN — TRAZODONE HYDROCHLORIDE 150 MG: 100 TABLET ORAL at 00:31

## 2022-06-02 RX ADMIN — MIRTAZAPINE 30 MG: 30 TABLET, ORALLY DISINTEGRATING ORAL at 00:30

## 2022-06-02 RX ADMIN — CLONAZEPAM 1 MG: 1 TABLET ORAL at 09:07

## 2022-06-02 NOTE — CASE MANAGEMENT/SOCIAL WORK
Continued Stay Note  [facility]     Patient Name: Sarahi Bentley  MRN: [id number]  Today's Date: 6/2/2022    Admit Date: 6/1/2022     Discharge Plan     Row Name 06/02/22 1658       Plan    Plan Return to Assisted Living at [facility]    Plan Comments Spoke to the patient’s daughter Emmanuelle Apodaca [phone] who provided the patient’s nurse practitioner with the contact number for [facility]. CCP spoke to Prudence Negron [phone] who confirmed that she has spoken to the  Sarah Lombardi and they are agreeable to the patient returning today. Prudence was informed that multiple voicemail messages and emails were left for the  Sarah Lombardi [phone], [facility] [phone] who is out of the office and Irene Cazares who works with [facility] to discuss the patient’s return.  The patient’s daughter stated that the patient would require transport home upon d/c and [facility] wheelchair van was arranged for the patient at 6:30pm.  The patient, her daughter and Prudence were all informed.  Prudence was informed of P.T. recommendations for HH upon d/c and stated that they have onsite therapy. [facility] will follow to assist with the patient’s d/c to Assisted Living at [facility] and will be transported by [facility] wheelchair van. DAMON JOHNSTON               Discharge Codes    No documentation.               Expected Discharge Date and Time     Expected Discharge Date Expected Discharge Time    [place]avan 3, 2022             DAMON Kauffman

## 2022-06-02 NOTE — DISCHARGE SUMMARY
Date of Admission: 6/1/2022  Date of Discharge:  6/2/2022  Primary Care Physician: Pao Zamora MD     Discharge Diagnosis:  Active Hospital Problems    Diagnosis  POA   • **Closed dislocation of left hip, initial encounter ([facility]) [S73.005A]  Yes   • Atrial fibrillation ([facility]) [I48.91]  Unknown   • HTN (hypertension) [I10]  Unknown   • IBS (irritable bowel syndrome) [K58.9]  Unknown      Resolved Hospital Problems   No resolved problems to display.       DETAILS OF HOSPITAL STAY     Pertinent Test Results and Procedures Performed    X-ray of left hip and pelvis:  An AP view of the pelvis as well as AP and frog leg lateral   views of the left hip demonstrates bilateral hip prostheses. There is   dislocation of the left femoral component superiorly. There is no   evidence of fracture. Moderate to severe loss of disc height and vacuum   disc effect is noted from L3 to L5.     Hospital Course  This is an 87-year-old female with history of atrial fibrillation who presented to the emergency room for evaluation of left hip pain.  This began suddenly while she was sitting on the edge of her bed and reaching awkwardly for something next to her.  She felt her hip pop.  She has a history of left hip replacement and prior dislocations.  She did not experience any fall or trauma.  Orthopedic surgery was consulted and she underwent closed reduction in the operating room last evening.  We were asked to admit her for observation.  She reports some back and leg pain from the uncomfortable mattress last night but no other complaints at this time.  She has been evaluated by orthopedic surgery this morning who feel that she is doing well post reduction.  She can weight-bear as tolerated per their recommendations and they have cleared her for discharge.  She has been evaluated by physical therapy and care coordination has communicated with her assisted living facility.  She has been deemed appropriate to return back there  today in stable condition.    Physical Exam at Discharge:  General: No acute distress, AAOx3  HEENT: EOMI, PERRL  Cardiovascular: +s1 and s2, RRR  Lungs: No rhonchi or wheezing  Abdomen: soft, nontender    Consults:   Consults     Date and Time Order Name Status Description    6/1/2022  6:37 PM Inpatient Hospitalist Consult      6/1/2022  5:14 PM Ortho (on-call MD unless specified) Completed     6/1/2022  2:37 PM Ortho (on-call MD unless specified)              Condition on Discharge: Stable, improved    Discharge Disposition  She will return to her assisted living facility    Discharge Medications     Discharge Medications      Continue These Medications      Instructions Start Date   apixaban 2.5 MG tablet tablet  Commonly known as: ELIQUIS   2.5 mg, Oral, Every 12 Hours Scheduled      clonazePAM 1 MG tablet  Commonly known as: KlonoPIN   1 mg, Oral, 2 Times Daily      docusate sodium 100 MG capsule  Commonly known as: COLACE   100 mg, Oral, Daily      escitalopram 10 MG tablet  Commonly known as: LEXAPRO   10 mg, Oral, Daily      loperamide 2 MG capsule  Commonly known as: IMODIUM   2 mg, Oral, Daily PRN      losartan 50 MG tablet  Commonly known as: COZAAR   50 mg, Oral, Daily      melatonin 5 MG tablet tablet   5 mg, Oral, Nightly      metoprolol succinate  MG 24 hr tablet  Commonly known as: TOPROL-XL   100 mg, Oral, Daily      mirtazapine 30 MG tablet  Commonly known as: REMERON   30 mg, Oral, Nightly      Multivitamin Adults 50+ tablet tablet   1 tablet, Oral, Daily      Ocuvite-Lutein tablet tablet   1 tablet, Oral, Daily      ondansetron 4 MG tablet  Commonly known as: ZOFRAN   4 mg, Oral, Every 8 Hours PRN      traZODone 50 MG tablet  Commonly known as: DESYREL   50 mg, Oral, Nightly      Vitamin D3 1.25 MG (95950 UT) capsule   50,000 Units, Oral, Every 14 Days, On Saturdays      zolpidem CR 12.5 MG CR tablet  Commonly known as: AMBIEN CR   12.5 mg, Oral, Nightly             Discharge Diet:   Diet  Instructions     Diet: Regular      Discharge Diet: Regular          Activity at Discharge:   Activity Instructions     Activity as Tolerated            Follow-up Appointments  No future appointments.  Additional Instructions for the Follow-ups that You Need to Schedule     Discharge Follow-up with PCP   As directed       Currently Documented PCP:    Pao Zamora MD    PCP Phone Number:    [phone]     Follow Up Details: 1 week             I have examined and discussed discharge planning with the patient today.    I wore full protective equipment throughout the patient encounter including eye protection and facemask.  Hand hygiene was performed before donning protective equipment and after removal when leaving the room.     John Milian MD  06/02/22  16:04 EDT    Time: Discharge greater than 30 min

## 2022-06-02 NOTE — PLAN OF CARE
Goal Outcome Evaluation:  Plan of Care Reviewed With: patient           Outcome Evaluation: Pt 86 yo female admitted post L hip location s/p L hip closed reduction. Pt ambulated 70ft Mod I rwx, no balance deficits, Educated pt on hip therex and posterior hip precautions, pt receptive to PT recommendation. Pt to DC today- rec C at [facility] as necessary. will sign off, nsg notified.    ..Patient was not wearing a face mask during this therapy encounter. Therapist used appropriate personal protective equipment including eye protection, mask, and gloves.  Mask used was standard procedure mask. Appropriate PPE was worn during the entire therapy session. Hand hygiene was completed before and after therapy session. Patient is not in enhanced droplet precautions.

## 2022-06-02 NOTE — PROGRESS NOTES
Clinical Pharmacy Services: Medication History    Sarahi Bentley is a 87 y.o. female presenting to [facility] for Closed dislocation of left hip, initial encounter ([facility]) [S73.005A]    She  has a past medical history of Atrial fibrillation ([facility]) and H/O degenerative disc disease.    Allergies as of 06/01/2022 - Reviewed 06/01/2022   Allergen Reaction Noted    Sulfa antibiotics Rash 03/01/2012       Medication information was obtained from: patient's pharmacy  Pharmacy and Phone Number: [facility] pharmacy ([phone])    Prior to Admission Medications       Prescriptions Last Dose Informant Patient Reported? Taking?    apixaban (ELIQUIS) 2.5 MG tablet tablet  Pharmacy Yes Yes    Take 2.5 mg by mouth Every 12 (Twelve) Hours.    Cholecalciferol (Vitamin D3) 1.25 MG (86745 UT) capsule  Pharmacy Yes Yes    Take 50,000 Units by mouth Every 14 (Fourteen) Days. On Saturdays    clonazePAM (KlonoPIN) 1 MG tablet  Pharmacy Yes Yes    Take 1 mg by mouth 2 (Two) Times a Day.    docusate sodium (COLACE) 100 MG capsule  Pharmacy Yes Yes    Take 100 mg by mouth Daily.    escitalopram (LEXAPRO) 10 MG tablet  Pharmacy Yes Yes    Take 10 mg by mouth Daily.    loperamide (IMODIUM) 2 MG capsule  Pharmacy Yes Yes    Take 2 mg by mouth Daily As Needed for Diarrhea.    losartan (COZAAR) 50 MG tablet  Pharmacy Yes Yes    Take 50 mg by mouth Daily.    melatonin 5 MG tablet tablet  Pharmacy Yes Yes    Take 5 mg by mouth Every Night.    ondansetron (ZOFRAN) 4 MG tablet  Pharmacy Yes Yes    Take 4 mg by mouth Every 8 (Eight) Hours As Needed for Nausea or Vomiting.    traZODone (DESYREL) 50 MG tablet  Pharmacy Yes Yes    Take 50 mg by mouth Every Night.    metoprolol succinate XL (TOPROL-XL) 100 MG 24 hr tablet  Pharmacy Yes No    Take 100 mg by mouth Daily.    mirtazapine (REMERON) 30 MG tablet  Pharmacy Yes No    Take 30 mg by mouth Every Night.    multivitamin with minerals (Multivitamin Adults 50+) tablet tablet  Pharmacy Yes  No    Take 1 tablet by mouth Daily.    multivitamin with minerals (Ocuvite-Lutein) tablet tablet  Pharmacy Yes No    Take 1 tablet by mouth Daily.    zolpidem CR (AMBIEN CR) 12.5 MG CR tablet  Pharmacy Yes No    Take 12.5 mg by mouth Every Night.              Medication notes: Based on the information obtained from patient's pharmacy, following changes have been made to patient's PTA medication list.     - Reviewed RAY and clarified with patient and pt's pharmacy about clonazepam dosing. Patient takes clonazepam 1 mg twice daily.  - Updated: Trazodone 50 mg nightly (no longer takes 150 mg), metoprolol succinate 100 mg daily (no longer takes metoprolol tartrate), melatonin 5 mg nightly (no longer takes 10 mg)  - Added: Zolpidem ER 12.5 mg nightly, multivitamins, and Ocuvite    This medication list is complete to the best of my knowledge as of 6/2/2022    Please call pharmacy for any questions.     Mary Rogel PharmD, BCPS  06/02/22 12:47 EDT

## 2022-06-02 NOTE — PLAN OF CARE
Goal Outcome Evaluation:              Outcome Evaluation: Pt is A/O x4, on room air, up with assist x1.  Ambulated with PT today.  VSS, no c/o pain, will continue to monitor.

## 2022-06-02 NOTE — CASE MANAGEMENT/SOCIAL WORK
Continued Stay Note  [facility]     Patient Name: Sarahi Bentley  MRN: [id number]  Today's Date: 6/2/2022    Admit Date: 6/1/2022     Discharge Plan     Row Name 06/02/22 1119       Plan    Plan Return to [facility]    Patient/Family in Agreement with Plan yes    Plan Comments CCP made outbound call at 11:07am regarding LOC patient needs to be at in order to return to [facility].  unavailable, CCP left  for [facility]  to return call. CCP awaiting a return call from [place].    Row Name 06/02/22 1117       Plan    Plan Return to [facility]    Patient/Family in Agreement with Plan yes    Plan Comments CCP met with patient at bedside introduced self and explained role. Patient confirmed the demographic information on her face sheet is accurate. Patient states that she resides at [facility]. Patient states her PCP is Dr. Pao Zamora. Patient states she uses a rollator to assist with her ambulation. Patient denies a history of SNF. Patient is unsure if she has worked with HH in the past or not. Patient does not have any stairs to enter/exit the facility and no stairs within the facility to maneuver. Patient is enrolled in the [facility] program. CCP discussed patient’s IMM, patient verbalized her understanding, signed and dated IMM. CCP left patient a copy of the IMM for her records. Patient unsure about transportation at discharge and may need transportation arrangements made to return to [facility].               Discharge Codes    No documentation.               Expected Discharge Date and Time     Expected Discharge Date Expected Discharge Time    Jun 2, 2022

## 2022-06-02 NOTE — CASE MANAGEMENT/SOCIAL WORK
Discharge Planning Assessment  [facility]     Patient Name: Sarahi Bentley  MRN: [id number]  Today's Date: 6/2/2022    Admit Date: 6/1/2022     Discharge Needs Assessment     Row Name 06/02/22 1114       Living Environment    People in Home facility resident    Current Living Arrangements extended care facility    Primary Care Provided by self    Provides Primary Care For no one    Family Caregiver if Needed other (see comments)  Staff @ [facility]    Quality of Family Relationships helpful;involved;supportive    Able to Return to Prior Arrangements yes       Resource/Environmental Concerns    Resource/Environmental Concerns none    Transportation Concerns none       Transition Planning    Patient/Family Anticipates Transition to other (see comments)  [facility]    Patient/Family Anticipated Services at Transition none    Transportation Anticipated family or friend will provide;health plan transportation       Discharge Needs Assessment    Readmission Within the Last 30 Days no previous admission in last 30 days    Current Outpatient/Agency/Support Group assisted living facility    Equipment Currently Used at Home rollator    Concerns to be Addressed no discharge needs identified;denies needs/concerns at this time    Anticipated Changes Related to Illness none    Equipment Needed After Discharge none    Outpatient/Agency/Support Group Needs assisted living facility    Discharge Facility/Level of Care Needs assisted living facility    Provided Post Acute Provider List? N/A    N/A Provider List Comment Patient plans to return to her custodial    Provided Post Acute Provider Quality & Resource List? N/A    N/A Quality & Resource List Comment Patient plans to return to her custodial               Discharge Plan     Row Name 06/02/22 1117       Plan    Plan Return to [facility]    Patient/Family in Agreement with Plan yes    Plan Comments CCP met with patient at bedside  introduced self and explained role. Patient confirmed the demographic information on her face sheet is accurate. Patient states that she resides at [facility]. Patient states her PCP is Dr. Pao Zamora. Patient states she uses a rollator to assist with her ambulation. Patient denies a history of SNF. Patient is unsure if she has worked with HH in the past or not. Patient does not have any stairs to enter/exit the facility and no stairs within the facility to maneuver. Patient is enrolled in the [facility] program. CCP discussed patient’s IMM, patient verbalized her understanding, signed and dated IMM. CCP left patient a copy of the IMM for her records. Patient unsure about transportation at discharge and may need transportation arrangements made to return to [facility].              Continued Care and Services - Admitted Since 6/1/2022    Coordination has not been started for this encounter.       Expected Discharge Date and Time     Expected Discharge Date Expected Discharge Time    Jun 2, 2022          Demographic Summary     Row Name 06/02/22 1112       General Information    Admission Type inpatient    Arrived From other (see comments)  [facility]    Required Notices Provided Important Message from Medicare    Reason for Consult discharge planning    Preferred Language English               Functional Status     Row Name 06/02/22 1114       Functional Status    Usual Activity Tolerance moderate    Current Activity Tolerance fair       Functional Status, IADL    Medications assistive person    Meal Preparation assistive person    Housekeeping assistive person    Laundry assistive person    Shopping assistive person       Mental Status    General Appearance WDL WDL       Mental Status Summary    Recent Changes in Mental Status/Cognitive Functioning no changes       Employment/    Employment Status retired               Psychosocial    No  documentation.                Abuse/Neglect    No documentation.                Legal    No documentation.                Substance Abuse    No documentation.                Patient Forms    No documentation.

## 2022-06-02 NOTE — ANESTHESIA POSTPROCEDURE EVALUATION
"Patient: Sarahi Bentley    Procedure Summary     Date: 06/01/22 Room / Location: [facility] OR [address] [place] [address] MAIN OR    Anesthesia Start: 1854 Anesthesia Stop: 1929    Procedure: LEFT HIP CLOSED REDUCTION DISLOCATION (Left Hip) Diagnosis:     Surgeons: Tristan Buckner Jr., MD Provider: Venus Conteh MD    Anesthesia Type: general ASA Status: 3          Anesthesia Type: general    Vitals  Vitals Value Taken Time   /60 06/01/22 2101   Temp 36.6 °C (97.9 °F) 06/01/22 1931   Pulse 97 06/01/22 2104   Resp 16 06/01/22 2045   SpO2 80 % 06/01/22 2104   Vitals shown include unvalidated device data.        Post Anesthesia Care and Evaluation    Patient location during evaluation: bedside  Patient participation: complete - patient participated  Level of consciousness: awake and alert  Pain score: 0  Pain management: adequate  Airway patency: patent  Anesthetic complications: No anesthetic complications  PONV Status: none  Cardiovascular status: acceptable  Respiratory status: acceptable  Hydration status: acceptable    Comments: /87   Pulse 80   Temp 36.6 °C (97.9 °F) (Oral)   Resp 16   Ht 167.6 cm (66\")   Wt 77.2 kg (170 lb 4.8 oz)   SpO2 91%   BMI 27.49 kg/m²         "

## 2022-06-02 NOTE — CASE MANAGEMENT/SOCIAL WORK
Case Management Discharge Note      Final Note: Return to Assisted Living at [facility] and will be transported by [facility] wheelchair DAMON angel T.D    Provided Post Acute Provider List?: N/A  N/A Provider List Comment: Patient plans to return to her [facility]  Provided Post Acute Provider Quality & Resource List?: N/A  N/A Quality & Resource List Comment: Patient plans to return to her [facility]    Selected Continued Care - Admitted Since 6/1/2022     Destination Coordination complete.    Service Provider Selected Services Address Phone Fax Patient Preferred    [place] [phone] --          Durable Medical Equipment    No services have been selected for the patient.              Dialysis/Infusion    No services have been selected for the patient.              Home Medical Care    No services have been selected for the patient.              Therapy    No services have been selected for the patient.              Community Resources    No services have been selected for the patient.              Community & DME    No services have been selected for the patient.                  Transportation Services  W/C Van:  ([facility])    Final Discharge Disposition Code: 01 - home or self-care

## 2022-06-02 NOTE — PLAN OF CARE
Problem: Adult Inpatient Plan of Care  Goal: Plan of Care Review  Outcome: Ongoing, Progressing  Flowsheets (Taken 6/2/2022 0512)  Progress: improving  Plan of Care Reviewed With: patient  Outcome Evaluation: A&Ox4, VSS, patient anxious about medications and her daily schedule, pain is minimal per patient and turns well with assistance, purewick in use, back to facility today, patient resting in bed.

## 2022-06-02 NOTE — THERAPY DISCHARGE NOTE
Acute Care - Physical Therapy Initial Evaluation/Discharge   [facility]     Patient Name: Sarahi Bentley  : 1935  MRN: [id number]  Today's Date: 2022   Onset of Illness/Injury or Date of Surgery: 22            Admit Date: 2022    Visit Dx:    ICD-10-CM ICD-9-CM   1. Closed dislocation of left hip, initial encounter ([facility])  S73.005A 835.00   2. Generalized weakness  R53.1 780.79     Patient Active Problem List   Diagnosis   • Closed dislocation of left hip, initial encounter ([facility])   • Atrial fibrillation ([facility])   • HTN (hypertension)   • IBS (irritable bowel syndrome)     Past Medical History:   Diagnosis Date   • Atrial fibrillation ([facility])    • H/O degenerative disc disease      Past Surgical History:   Procedure Laterality Date   • BREAST BIOPSY     • HIP CLOSED REDUCTION Left 2022    Procedure: LEFT HIP CLOSED REDUCTION DISLOCATION;  Surgeon: Tristan Buckner Jr., MD;  Location: [facility];  Service: Orthopedics;  Laterality: Left;   • HIP SURGERY Bilateral    • KNEE SURGERY Bilateral        PT Assessment (last 12 hours)     PT Evaluation and Treatment     Row Name 22 1500          Physical Therapy Time and Intention    Subjective Information no complaints  -     Document Type discharge evaluation/summary  -     Mode of Treatment individual therapy;physical therapy  -     Patient Effort excellent  -     Symptoms Noted During/After Treatment none  -     Row Name 22 1500          General Information    Patient Profile Reviewed yes  -     Onset of Illness/Injury or Date of Surgery 22  -     Patient Observations cooperative;alert;agree to therapy  -     General Observations of Patient pt reclined in chair no acute distress  -     Prior Level of Function independent:  -     Equipment Currently Used at Home walker, rolling  -     Pertinent History of Current Functional Problem post op L hip closed reduction  -     Existing Precautions/Restrictions  fall;hip, posterior;left  -LH     Risks Reviewed patient:  -     Benefits Reviewed patient:  -[facility] Name 06/02/22 1500          Living Environment    Current Living Arrangements independent living facility  -[facility] Name 06/02/22 1500          Pain    Pretreatment Pain Rating 0/10 - no pain  -     Posttreatment Pain Rating 0/10 - no pain  -[facility] Name 06/02/22 1500          Cognition    Affect/Mental Status (Cognition) [facility]  -     Orientation Status (Cognition) oriented x 4  -[facility] Name 06/02/22 1500          Range of Motion Comprehensive    General Range of Motion bilateral lower extremity ROM WFL  -[facility] Name 06/02/22 1500          Strength (Manual Muscle Testing)    Strength (Manual Muscle Testing) strength is WFL;bilateral lower extremities  -[facility] Name 06/02/22 1500          Bed Mobility    Comment, (Bed Mobility) NT  -[facility] Name 06/02/22 1500          Transfers    Transfers sit-stand transfer;stand-sit transfer  -     Sit-Stand [place] (Transfers) modified [facility]     Stand-Sit [place] (Transfers) modified [place]  -[facility] Name 06/02/22 1500          Sit-Stand Transfer    Assistive Device (Sit-Stand Transfers) walker, front-wheeled  -[facility] Name 06/02/22 1500          Stand-Sit Transfer    Assistive Device (Stand-Sit Transfers) walker, front-wheeled  -[facility] Name 06/02/22 1500          Gait/Stairs (Locomotion)    [place] Level (Gait) modified [facility]     Assistive Device (Gait) walker, front-wheeled  [facility]     Distance in Feet (Gait) 70  -     Pattern (Gait) step-through  -     Bilateral Gait Deviations heel strike decreased  -[facility] Name 06/02/22 1500          Balance    Balance Assessment sitting static balance;standing static balance  -     Static Sitting Balance independent  -     Position, Sitting Balance unsupported;sitting in chair  -     Static Standing Balance modified [facility]      Position/Device Used, Standing Balance supported;walker, front-wheeled  -[facility] Name 06/02/22 1500          Motor Skills    Therapeutic Exercise --  APs x 10  -[facility] Name 06/02/22 1500          Plan of Care Review    Plan of Care Reviewed With patient  -     Outcome Evaluation Pt 88 yo female admitted post L hip location s/p L hip closed reduction. Pt ambulated 70ft Mod I rwx, no balance deficits, Educated pt on hip therex and posterior hip precautions, pt receptive to PT recommendation. Pt to DC today- [facility]. will sign off, nsg notified.  -[facility] Name 06/02/22 1500          Positioning and Restraints    Pre-Treatment Position sitting in chair/recliner  -     Post Treatment Position chair  -     In Chair reclined;call light within reach;encouraged to call for assist;notified nsg  no alarm in place when entered room  -[facility] Name 06/02/22 1500          Therapy Assessment/Plan (PT)    Therapy Frequency (PT) evaluation only  -[facility] Name 06/02/22 1500          PT Evaluation Complexity    History, PT Evaluation Complexity 1-2 personal factors and/or comorbidities  -     Examination of Body Systems (PT Eval Complexity) total of 3 or more elements  -     Clinical Presentation (PT Evaluation Complexity) evolving  -     Clinical Decision Making (PT Evaluation Complexity) low complexity  -     Overall Complexity (PT Evaluation Complexity) low complexity  -           User Key  (r) = Recorded By, (t) = Taken By, (c) = Cosigned By    Initials Name Provider Type     Latoya Miles, PT Physical Therapist                  Physical Therapy Education                 Title: PT OT SLP Therapies (Done)     Topic: Physical Therapy (Done)     Point: Mobility training (Done)     Learning Progress Summary           Patient Acceptance, E, REBECCA,JUAN JOSE,NR by  at 6/2/2022 1533                   Point: Home exercise program (Done)     Learning Progress Summary           Patient Acceptance,  E, VU,DU,NR by  at 6/2/2022 1533                   Point: Body mechanics (Done)     Learning Progress Summary           Patient Acceptance, E, VU,DU,NR by  at 6/2/2022 1533                   Point: Precautions (Done)     Learning Progress Summary           Patient Acceptance, E, VU,DU,NR by  at 6/2/2022 1533                               User Key     Initials Effective Dates Name Provider Type Critical access hospital 06/16/21 -  Latoya Miles, PT Physical Therapist PT                PT Recommendation and Plan  Anticipated Discharge Disposition (PT): assisted living, home with home health  Therapy Frequency (PT): evaluation only  Plan of Care Reviewed With: patient  Outcome Evaluation: Pt 86 yo female admitted post L hip location s/p L hip closed reduction. Pt ambulated 70ft Mod I rwx, no balance deficits, Educated pt on hip therex and posterior hip precautions, pt receptive to PT recommendation. Pt to DC today- rec C at [facility] as necessary. will sign off, nsg notified.     Outcome Measures     Row Name 06/02/22 1500             How much help from another person do you currently need...    Turning from your back to your side while in flat bed without using bedrails? 4  -LH      Moving from lying on back to sitting on the side of a flat bed without bedrails? 4  -LH      Moving to and from a bed to a chair (including a wheelchair)? 4  -LH      Standing up from a chair using your arms (e.g., wheelchair, bedside chair)? 4  -LH      Climbing 3-5 steps with a railing? 3  -LH      To walk in hospital room? 4  -      AM-PAC 6 Clicks Score (PT) 23  -              Functional Assessment    Outcome Measure Options AM-PAC 6 Clicks Basic Mobility (PT)  -            User Key  (r) = Recorded By, (t) = Taken By, (c) = Cosigned By    Initials Name Provider Type     Latoya Miles, PT Physical Therapist                 Time Calculation:    PT Charges     Row Name 06/02/22 1533             Time Calculation    Start Time 1335  -       Stop Time 1347  -      Time Calculation (min) 12 min  -      PT Received On 06/02/22  -              Time Calculation- PT    Total Timed Code Minutes- PT 8 minute(s)  -            User Key  (r) = Recorded By, (t) = Taken By, (c) = Cosigned By    Initials Name Provider Type     Latoya Miles, PT Physical Therapist              Therapy Charges for Today     Code Description Service Date Service Provider Modifiers Qty    43632353686 HC PT EVAL LOW COMPLEXITY 2 6/2/2022 Latoya Miles, PT GP 1    14888767683 HC PT THER PROC EA 15 MIN 6/2/2022 Latoya Miles, PT GP 1          PT G-Codes  Outcome Measure Options: AM-PAC 6 Clicks Basic Mobility (PT)  AM-PAC 6 Clicks Score (PT): 23    PT Discharge Summary  Anticipated Discharge Disposition (PT): assisted living, home with home health    Latoya Miles, PT  6/2/2022

## 2022-06-02 NOTE — H&P
[facility] Admission H&P    Patient Care Team:  Pao Zamora MD as PCP - General (Geriatric Medicine)    Chief complaint: Hip dislocation    History of Present Illness    This is an 87-year-old female with history of atrial fibrillation who presented to the emergency room for evaluation of left hip pain.  This began suddenly while she was sitting on the edge of her bed and reaching awkwardly for something next to her.  She felt her hip pop.  She has a history of left hip replacement and prior dislocations.  She did not experience any fall or trauma.  Orthopedic surgery was consulted and she underwent closed reduction in the operating room last evening.  We were asked to admit her for observation.  She reports some back and leg pain from the uncomfortable mattress last night but no other complaints at this time.  She has been evaluated by orthopedic surgery this morning who feel that she is doing well post reduction.  She can weight-bear as tolerated per their recommendations.  Patient is nervous about discharge back to her assisted living.  Has not yet been up out of bed with physical therapy.    Past Medical History:   Diagnosis Date   • Atrial fibrillation (HCC)    • H/O degenerative disc disease      Past Surgical History:   Procedure Laterality Date   • BREAST BIOPSY     • HIP SURGERY Bilateral    • KNEE SURGERY Bilateral      History reviewed. No pertinent family history.  Social History     Tobacco Use   • Smoking status: Never Smoker   • Smokeless tobacco: Never Used   Substance Use Topics   • Alcohol use: Never   • Drug use: Never     Medications Prior to Admission   Medication Sig Dispense Refill Last Dose   • apixaban (ELIQUIS) 2.5 MG tablet tablet Take 2.5 mg by mouth Every 12 (Twelve) Hours.      • clonazePAM (KlonoPIN) 0.5 MG tablet Take 0.5 mg by mouth Daily With Lunch.      • clonazePAM (KlonoPIN) 1 MG tablet Take 1 mg by mouth Every Morning.      • clonazePAM (KlonoPIN) 1 MG tablet Take 2 mg  by mouth Every Night.      • docusate sodium (COLACE) 100 MG capsule Take 100 mg by mouth Daily.      • escitalopram (LEXAPRO) 10 MG tablet Take 10 mg by mouth Daily.      • loperamide (IMODIUM) 2 MG capsule Take 2 mg by mouth Daily As Needed for Diarrhea.      • losartan (COZAAR) 50 MG tablet Take 50 mg by mouth Daily.      • melatonin 5 MG tablet tablet Take 10 mg by mouth Every Night.      • metoprolol tartrate (LOPRESSOR) 100 MG tablet Take 100 mg by mouth Daily.      • mirtazapine (REMERON SOL-TAB) 30 MG disintegrating tablet Place 30 mg on the tongue Every Night.      • ondansetron (ZOFRAN) 4 MG tablet Take 4 mg by mouth Every 8 (Eight) Hours As Needed for Nausea or Vomiting.      • traZODone (DESYREL) 150 MG tablet Take 150 mg by mouth Every Night.      • traZODone (DESYREL) 50 MG tablet Take 25 mg by mouth At Night As Needed for Sleep.      • vitamin D3 125 MCG (5000 UT) capsule capsule Take 5,000 Units by mouth Daily.        Allergies:  Sulfa antibiotics    Review of Systems   Constitutional: Negative for chills and fever.   HENT: Negative for congestion and sore throat.    Eyes: Negative for visual disturbance.   Respiratory: Negative for cough, chest tightness, shortness of breath and wheezing.    Cardiovascular: Negative for chest pain, palpitations and leg swelling.   Gastrointestinal: Negative for abdominal distention, abdominal pain, diarrhea, nausea and vomiting.   Endocrine: Negative for polydipsia and polyuria.   Genitourinary: Negative for difficulty urinating, dysuria, frequency and urgency.   Musculoskeletal: Positive for arthralgias and back pain. Negative for myalgias.   Skin: Negative for color change and rash.   Neurological: Negative for dizziness and light-headedness.        PHYSICAL EXAM    Vital Signs  tMax 24 hrs:  Temp (24hrs), Av.7 °F (36.5 °C), Min:97.1 °F (36.2 °C), Max:98.3 °F (36.8 °C)    Vitals Ranges:  Temp:  [97.1 °F (36.2 °C)-98.3 °F (36.8 °C)] 98.2 °F (36.8 °C)  Heart  Rate:  [] 99  Resp:  [16-20] 16  BP: (107-146)/(53-95) 108/62    Physical Exam  Vitals and nursing note reviewed.   Constitutional:       General: She is not in acute distress.  HENT:      Head: Normocephalic and atraumatic.   Eyes:      Extraocular Movements: Extraocular movements intact.      Pupils: Pupils are equal, round, and reactive to light.   Cardiovascular:      Rate and Rhythm: Normal rate and regular rhythm.   Pulmonary:      Effort: Pulmonary effort is normal. No respiratory distress.      Breath sounds: Normal breath sounds.   Abdominal:      General: There is no distension.      Palpations: Abdomen is soft.      Tenderness: There is no abdominal tenderness.   Musculoskeletal:         General: No swelling or deformity.      Cervical back: Normal range of motion.      Comments: Legs are currently stabilized in place with a wedge   Skin:     General: Skin is warm and dry.   Neurological:      General: No focal deficit present.      Mental Status: She is alert and oriented to person, place, and time.         Results Review:     I reviewed the patient's new clinical results.  I reviewed the patient's new imaging results and agree with the interpretation.        Active Hospital Problems    Diagnosis  POA   • **Closed dislocation of left hip, initial encounter ([facility]) [S73.005A]  Yes   • Atrial fibrillation ([facility]) [I48.91]  Unknown   • HTN (hypertension) [I10]  Unknown   • IBS (irritable bowel syndrome) [K58.9]  Unknown      Resolved Hospital Problems   No resolved problems to display.       Assessment & Plan    -Postop day 1 from closed left prosthetic hip reduction in the operating room.  Orthopedic surgery indicates that she is doing well from their perspective.  We will consult PT to see what her functional status is as she did come to us from assisted living.  -Restart anticoagulation and metoprolol for control of atrial fibrillation  -Blood pressure well controlled.  We will hold her other  antihypertensives for the moment  -Continue as needed Imodium for her diarrhea predominant IBS  -Disposition: Pending evaluation from physical therapy.  CCP to assist with planning.    I discussed the patients findings and my recommendations with patient    I wore full protective equipment throughout the patient encounter including eye protection and facemask.  Hand hygiene was performed before donning protective equipment and after removal when leaving the room.    John Milian MD  06/02/22  08:18 EDT

## 2022-06-02 NOTE — PERIOPERATIVE NURSING NOTE
Violeta from [facility] returned call advised of consult, stated they would see her.

## 2022-06-02 NOTE — PROGRESS NOTES
"Orthopaedic Surgery  Daily Progress Note    /71 (BP Location: Left arm, Patient Position: Lying)   Pulse 105   Temp 97.3 °F (36.3 °C) (Oral)   Resp 16   Ht 167.6 cm (66\")   Wt 77.2 kg (170 lb 4.8 oz)   SpO2 94%   BMI 27.49 kg/m²     Lab Results (last 24 hours)     Procedure Component Value Units Date/Time    COVID PRE-OP / PRE-PROCEDURE SCREENING ORDER (NO ISOLATION) - Swab, Nasopharynx [[id number]]  (Normal) Collected: 06/01/22 1624    Specimen: Swab from Nasopharynx Updated: 06/01/22 1743    Narrative:      The following orders were created for panel order COVID PRE-OP / PRE-PROCEDURE SCREENING ORDER (NO ISOLATION) - Swab, Nasopharynx.  Procedure                               Abnormality         Status                     ---------                               -----------         ------                     COVID-19,[facility] IN-HOUSE...[[id number]]  Normal              Final result                 Please view results for these tests on the individual orders.    COVID-19,[facility] IN-HOUSE CEPHEID/ANY NP SWAB IN TRANSPORT MEDIA 8-12 HR TAT - Swab, Nasopharynx [[id number]]  (Normal) Collected: 06/01/22 1624    Specimen: Swab from Nasopharynx Updated: 06/01/22 1743     COVID19 Not Detected    Narrative:      Fact sheet for providers: https://www.fda.gov/media/520090/download     Fact sheet for patients: https://www.fda.gov/media/120342/download  Fact sheet for providers: https://www.fda.gov/media/766444/download    Fact sheet for patients: https://www.fda.gov/media/347241/download    Test performed by PCR.          Imaging Results (Last 24 Hours)     Procedure Component Value Units Date/Time    XR Hip With or Without Pelvis 1 View Left [[id number]] Collected: 06/01/22 2104     Updated: 06/01/22 2109    Narrative:      XR HIP W OR WO PELVIS 1 VIEW LEFT-     INDICATIONS: Reduction of previous left hip dislocation     TECHNIQUE: Frontal view of the left hip     COMPARISON: 06/01/2022 1353 hours     FINDINGS:   "   Left hip arthroplasty hardware is normally located. No obvious fracture  is identified. Degenerative changes are seen at the symphysis pubis,  partly included lumbar spine.       Impression:         Reduction of previous left hip dislocation.     This report was finalized on 6/1/2022 9:06 PM by Dr. Boy Munoz M.D.       XR Hip With or Without Pelvis 2 - 3 View Left [[id number]] Collected: 06/01/22 1426     Updated: 06/01/22 5084    Narrative:      PELVIS AND LEFT HIP      HISTORY: Left hip pain.     FINDINGS: An AP view of the pelvis as well as AP and frog leg lateral  views of the left hip demonstrates bilateral hip prostheses. There is  dislocation of the left femoral component superiorly. There is no  evidence of fracture. Moderate to severe loss of disc height and vacuum  disc effect is noted from L3 to L5.     This report was finalized on 6/1/2022 3:46 PM by Dr. Jon Summers M.D.             Patient Care Team:  Pao Zamora MD as PCP - General (Geriatric Medicine)    SUBJECTIVE  Pain controlled    PHYSICAL EXAM  Resting in NAD  Abduction pillow in place    Leg lengths are equal and symmetric.  5 out of 5 tibialis anterior/gastrocsoleus.  No pain with passive motion at the hip.  Toes warm, perfused, brisk capillary refill  Sensation intact light touch over foot and ankle           Closed dislocation of left hip, initial encounter ([facility])      PLAN / DISPOSITION:  POD1 status post closed reduction of left hip dislocation under anesthesia, doing well      1. Pain control: Orals   2.  Antibiotics: None indicated  3.  PT: Patient can weight-bear as tolerated, posterior hip precautions  4. DVT:  Patient can resume home Eliquis, ANNA/SCDs, mobilization  5. Dispo:  Okay for discharge back to her facility from my perspective when okay with primary team.  Patient should follow-up with Dr. Josue Pulliam in 4 weeks at [facility] ([phone] to make appointment)        Tristan Buckner  MD [place]  06/02/22  06:40 EDT

## 2022-06-13 NOTE — PROGRESS NOTES
Enter Query Response Below      Query Response:       Permanent/chronic       If applicable, please update the problem list.         Patient: Sarahi Bentley        : 1935  Account: [id number]           Admit Date: 2022        Options to Respond to Query:    1. Access the Encounter     a. From the To-Do Side bar, click Respond With Note.     b. Click New Note     c. Answer query within the yellow box.                d. Update the Problem List if applicable.     Dr. Milian    87 yr old female admitted with recurrent spontaneous left hip dislocation and underwent closed reduction under anesthesia, left hip prosthetic dislocation. The patient has documented history of atrial fibrillation. Nursing & anesthesia documents patient's rhythm as atrial fibrillation. The patient is being treated at home with Eliquis, Cozaar and Lopressor.     Can you please further specify the atrial fibrillation as most likely:     - Permanent/chronic  - Persistent  - Atrial fibrillation unspecified   - Other (please specify) _________________  - Clinically indeterminable     By submitting this query, we are merely seeking further clarification of documentation to accurately reflect all conditions that you are monitoring, evaluating, treating or that extend the hospitalization or utilize additional resources of care. Please utilize your independent clinical judgment when addressing the question(s) above.      This query and your response, once completed, will be entered into the legal medical record.     Sincerely,  KATHARINA [email or web address]  Clinical Documentation Integrity Program     Emelina's original query re-submitted by:  Lisa Souza RN  Clinical Documentation Integrity Program  Ph. [phone]  [email or web address]

## (undated) DEVICE — GLV SURG SENSICARE W/ALOE PF LF 8 STRL

## (undated) DEVICE — PILLW ABD MD

## (undated) DEVICE — GLV SURG SENSICARE W/ALOE PF LF 7.5 STRL

## (undated) DEVICE — GLV SURG PREMIERPRO ORTHO LTX PF SZ7.5 BRN

## (undated) DEVICE — ANES CIRC EXTENDAFLEX-LF: Brand: MEDLINE INDUSTRIES, INC.